# Patient Record
Sex: MALE | Race: BLACK OR AFRICAN AMERICAN | URBAN - METROPOLITAN AREA
[De-identification: names, ages, dates, MRNs, and addresses within clinical notes are randomized per-mention and may not be internally consistent; named-entity substitution may affect disease eponyms.]

---

## 2023-05-15 ENCOUNTER — OFFICE VISIT (OUTPATIENT)
Age: 48
End: 2023-05-15
Payer: OTHER GOVERNMENT

## 2023-05-15 VITALS
TEMPERATURE: 97.3 F | HEART RATE: 67 BPM | OXYGEN SATURATION: 96 % | BODY MASS INDEX: 33.58 KG/M2 | WEIGHT: 253.4 LBS | RESPIRATION RATE: 18 BRPM | HEIGHT: 73 IN

## 2023-05-15 DIAGNOSIS — M43.6 NECK STIFFNESS: ICD-10-CM

## 2023-05-15 DIAGNOSIS — M47.816 LUMBAR SPONDYLOSIS: Primary | ICD-10-CM

## 2023-05-15 PROCEDURE — 72110 X-RAY EXAM L-2 SPINE 4/>VWS: CPT | Performed by: PHYSICAL MEDICINE & REHABILITATION

## 2023-05-15 PROCEDURE — 99202 OFFICE O/P NEW SF 15 MIN: CPT | Performed by: PHYSICAL MEDICINE & REHABILITATION

## 2023-05-15 RX ORDER — CETIRIZINE HYDROCHLORIDE 10 MG/1
TABLET ORAL
COMMUNITY
Start: 2023-04-06

## 2023-05-15 RX ORDER — AMLODIPINE BESYLATE 10 MG/1
TABLET ORAL
COMMUNITY
Start: 2023-04-06

## 2023-05-15 RX ORDER — TRAZODONE HYDROCHLORIDE 100 MG/1
TABLET ORAL
COMMUNITY
Start: 2023-04-06

## 2023-05-15 RX ORDER — CARVEDILOL 6.25 MG/1
TABLET ORAL
COMMUNITY
Start: 2023-04-06

## 2023-05-15 RX ORDER — GABAPENTIN 100 MG/1
CAPSULE ORAL
COMMUNITY
Start: 2020-06-15

## 2023-05-15 RX ORDER — ASPIRIN 325 MG
TABLET ORAL
COMMUNITY
Start: 2023-04-06

## 2023-05-15 RX ORDER — SILDENAFIL 100 MG/1
TABLET, FILM COATED ORAL
COMMUNITY
Start: 2023-04-06

## 2023-05-15 RX ORDER — TELMISARTAN AND HYDROCHLOROTHIAZIDE 80; 25 MG/1; MG/1
TABLET ORAL
COMMUNITY
Start: 2023-04-06

## 2023-05-15 NOTE — PROGRESS NOTES
[facility] [address]  [address], [address]  [place], [facility] [address]  Phone: [phone]  Fax: [phone]        [address]  : 1975  PCP: Astrid Echevarria MD    NEW PATIENT EVALUATION      ASSESSMENT AND PLAN    Alison Méndez was seen today for back problem, pain and back pain. Diagnoses and all orders for this visit:    Lumbar spondylosis  -     AMB POC XRAY, SPINE, LUMBOSACRAL; 4+    Neck stiffness         Clayburn Nmn Lynne Ching is a 52 y.o. male career [employer], recently retired. He wanted to establish spine care outside the [place] system. He had benefit with epidural injections last year. I do not have access to his MRI from last year. No evidence of radiculopathy or myelopathy on exam today. Advised to stand hourly at work. Advised to continue HEP as tolerated. Advised to continue activity as tolerated. Discussed fall precautions with patient. Discussed scapular retractions and chin tuck for postural awareness. Patient may take Aspirin as needed. Advised to take with food. Follow-up and Dispositions    Return in about 6 months (around 11/15/2023) for medication management. HISTORY OF PRESENT ILLNESS  Marcial Talamantes is seen today in consultation for neck and back pain. Pt complains of neck pain and stiffness. Denies radicular pain. Denies numbness or tingling in his arms. He notes trouble balancing, worse in the morning. History of bilateral Achilles tendon repairs. He also complains of low back exacerbated with bending and sitting. Back > neck pain. Denies pain radiating into his BLE, though he affirms a hx of sciatica. He walks three days a week for exercises. Pt takes Aspirin daily for pain. Pt denies any recent GI bleeds, ulcers, or renal dysfunction. He also takes Gabapentin at night for his neuropathy. Pt is compliant with his HEP.      Denies persistent fevers, chills, weight changes, saddle paresthesias, and neurogenic

## 2023-05-15 NOTE — PROGRESS NOTES
Yazmin Morgan presents today for   Chief Complaint   Patient presents with    Back Problem    Pain    Back Pain       Is someone accompanying this pt? no    Is the patient using any DME equipment during OV? no    Depression Screening:  No flowsheet data found. Learning Assessment:  No flowsheet data found. Abuse Screening:  No flowsheet data found. Fall Risk  No flowsheet data found. OPIOID RISK TOOL  No flowsheet data found. Coordination of Care:  1. Have you been to the ER, urgent care clinic since your last visit? no  Hospitalized since your last visit? no    2. Have you seen or consulted any other health care providers outside of the [address] [place] [address] since your last visit? no Include any pap smears or colon screening.  no